# Patient Record
Sex: FEMALE | Race: BLACK OR AFRICAN AMERICAN | NOT HISPANIC OR LATINO | Employment: UNEMPLOYED | RURAL
[De-identification: names, ages, dates, MRNs, and addresses within clinical notes are randomized per-mention and may not be internally consistent; named-entity substitution may affect disease eponyms.]

---

## 2020-03-16 ENCOUNTER — HISTORICAL (OUTPATIENT)
Dept: ADMINISTRATIVE | Facility: HOSPITAL | Age: 50
End: 2020-03-16

## 2020-12-07 ENCOUNTER — HISTORICAL (OUTPATIENT)
Dept: ADMINISTRATIVE | Facility: HOSPITAL | Age: 50
End: 2020-12-07

## 2020-12-07 LAB
SARS-COV+SARS-COV-2 AG RESP QL IA.RAPID: NEGATIVE
SARS-COV+SARS-COV-2 AG RESP QL IA.RAPID: NEGATIVE

## 2021-04-14 DIAGNOSIS — Z12.31 ENCOUNTER FOR SCREENING MAMMOGRAM FOR MALIGNANT NEOPLASM OF BREAST: Primary | ICD-10-CM

## 2021-04-29 ENCOUNTER — HOSPITAL ENCOUNTER (OUTPATIENT)
Dept: RADIOLOGY | Facility: HOSPITAL | Age: 51
Discharge: HOME | End: 2021-04-29
Payer: MEDICAID

## 2021-04-29 VITALS — HEIGHT: 66 IN | BODY MASS INDEX: 32.95 KG/M2 | WEIGHT: 205 LBS

## 2021-04-29 DIAGNOSIS — Z12.31 ENCOUNTER FOR SCREENING MAMMOGRAM FOR MALIGNANT NEOPLASM OF BREAST: ICD-10-CM

## 2021-04-29 PROCEDURE — 77067 SCR MAMMO BI INCL CAD: CPT | Mod: TC

## 2021-08-09 ENCOUNTER — OFFICE VISIT (OUTPATIENT)
Dept: FAMILY MEDICINE | Facility: CLINIC | Age: 51
End: 2021-08-09
Payer: MEDICAID

## 2021-08-09 VITALS
HEIGHT: 66 IN | BODY MASS INDEX: 34.87 KG/M2 | SYSTOLIC BLOOD PRESSURE: 126 MMHG | DIASTOLIC BLOOD PRESSURE: 82 MMHG | OXYGEN SATURATION: 100 % | WEIGHT: 217 LBS | HEART RATE: 78 BPM | TEMPERATURE: 98 F | RESPIRATION RATE: 17 BRPM

## 2021-08-09 DIAGNOSIS — I10 BENIGN ESSENTIAL HYPERTENSION: Primary | ICD-10-CM

## 2021-08-09 DIAGNOSIS — T46.4X5A COUGH DUE TO ACE INHIBITOR: ICD-10-CM

## 2021-08-09 DIAGNOSIS — R05.8 COUGH DUE TO ACE INHIBITOR: ICD-10-CM

## 2021-08-09 LAB
ANION GAP SERPL CALCULATED.3IONS-SCNC: 8 MMOL/L (ref 7–16)
BUN SERPL-MCNC: 6 MG/DL (ref 7–18)
BUN/CREAT SERPL: 10 (ref 6–20)
CALCIUM SERPL-MCNC: 9.4 MG/DL (ref 8.5–10.1)
CHLORIDE SERPL-SCNC: 106 MMOL/L (ref 98–107)
CO2 SERPL-SCNC: 30 MMOL/L (ref 21–32)
CREAT SERPL-MCNC: 0.58 MG/DL (ref 0.55–1.02)
GLUCOSE SERPL-MCNC: 96 MG/DL (ref 74–106)
POTASSIUM SERPL-SCNC: 5 MMOL/L (ref 3.5–5.1)
SODIUM SERPL-SCNC: 139 MMOL/L (ref 136–145)

## 2021-08-09 PROCEDURE — 99214 PR OFFICE/OUTPT VISIT, EST, LEVL IV, 30-39 MIN: ICD-10-PCS | Mod: ,,, | Performed by: NURSE PRACTITIONER

## 2021-08-09 PROCEDURE — 99214 OFFICE O/P EST MOD 30 MIN: CPT | Mod: ,,, | Performed by: NURSE PRACTITIONER

## 2021-08-09 PROCEDURE — 80048 BASIC METABOLIC PNL TOTAL CA: CPT | Mod: ,,, | Performed by: CLINICAL MEDICAL LABORATORY

## 2021-08-09 PROCEDURE — 80048 BASIC METABOLIC PANEL: ICD-10-PCS | Mod: ,,, | Performed by: CLINICAL MEDICAL LABORATORY

## 2021-08-09 RX ORDER — AMLODIPINE BESYLATE 10 MG/1
10 TABLET ORAL DAILY
Qty: 30 TABLET | Refills: 11 | Status: SHIPPED | OUTPATIENT
Start: 2021-08-09 | End: 2023-02-09

## 2021-08-09 RX ORDER — LISINOPRIL 10 MG/1
10 TABLET ORAL DAILY
COMMUNITY
Start: 2021-06-29 | End: 2021-08-09

## 2023-02-09 ENCOUNTER — OFFICE VISIT (OUTPATIENT)
Dept: FAMILY MEDICINE | Facility: CLINIC | Age: 53
End: 2023-02-09
Payer: MEDICAID

## 2023-02-09 ENCOUNTER — APPOINTMENT (OUTPATIENT)
Dept: RADIOLOGY | Facility: CLINIC | Age: 53
End: 2023-02-09
Attending: NURSE PRACTITIONER
Payer: MEDICAID

## 2023-02-09 VITALS
HEART RATE: 76 BPM | TEMPERATURE: 98 F | RESPIRATION RATE: 20 BRPM | WEIGHT: 204 LBS | OXYGEN SATURATION: 99 % | DIASTOLIC BLOOD PRESSURE: 70 MMHG | HEIGHT: 66 IN | BODY MASS INDEX: 32.78 KG/M2 | SYSTOLIC BLOOD PRESSURE: 122 MMHG

## 2023-02-09 DIAGNOSIS — M25.512 ACUTE PAIN OF LEFT SHOULDER: ICD-10-CM

## 2023-02-09 DIAGNOSIS — M25.512 ACUTE PAIN OF LEFT SHOULDER: Primary | ICD-10-CM

## 2023-02-09 PROCEDURE — 3074F PR MOST RECENT SYSTOLIC BLOOD PRESSURE < 130 MM HG: ICD-10-PCS | Mod: CPTII,,, | Performed by: NURSE PRACTITIONER

## 2023-02-09 PROCEDURE — 1159F MED LIST DOCD IN RCRD: CPT | Mod: CPTII,,, | Performed by: NURSE PRACTITIONER

## 2023-02-09 PROCEDURE — 99213 PR OFFICE/OUTPT VISIT, EST, LEVL III, 20-29 MIN: ICD-10-PCS | Mod: 25,,, | Performed by: NURSE PRACTITIONER

## 2023-02-09 PROCEDURE — 3078F DIAST BP <80 MM HG: CPT | Mod: CPTII,,, | Performed by: NURSE PRACTITIONER

## 2023-02-09 PROCEDURE — 99213 OFFICE O/P EST LOW 20 MIN: CPT | Mod: 25,,, | Performed by: NURSE PRACTITIONER

## 2023-02-09 PROCEDURE — 3078F PR MOST RECENT DIASTOLIC BLOOD PRESSURE < 80 MM HG: ICD-10-PCS | Mod: CPTII,,, | Performed by: NURSE PRACTITIONER

## 2023-02-09 PROCEDURE — 73030 XR SHOULDER COMPLETE 2 OR MORE VIEWS LEFT: ICD-10-PCS | Mod: 26,LT,, | Performed by: RADIOLOGY

## 2023-02-09 PROCEDURE — 73030 X-RAY EXAM OF SHOULDER: CPT | Mod: TC,RHCUB,LT | Performed by: NURSE PRACTITIONER

## 2023-02-09 PROCEDURE — 3074F SYST BP LT 130 MM HG: CPT | Mod: CPTII,,, | Performed by: NURSE PRACTITIONER

## 2023-02-09 PROCEDURE — 73030 X-RAY EXAM OF SHOULDER: CPT | Mod: 26,LT,, | Performed by: RADIOLOGY

## 2023-02-09 PROCEDURE — 96372 THER/PROPH/DIAG INJ SC/IM: CPT | Mod: ,,, | Performed by: NURSE PRACTITIONER

## 2023-02-09 PROCEDURE — 1159F PR MEDICATION LIST DOCUMENTED IN MEDICAL RECORD: ICD-10-PCS | Mod: CPTII,,, | Performed by: NURSE PRACTITIONER

## 2023-02-09 PROCEDURE — 3008F BODY MASS INDEX DOCD: CPT | Mod: CPTII,,, | Performed by: NURSE PRACTITIONER

## 2023-02-09 PROCEDURE — 96372 PR INJECTION,THERAP/PROPH/DIAG2ST, IM OR SUBCUT: ICD-10-PCS | Mod: ,,, | Performed by: NURSE PRACTITIONER

## 2023-02-09 PROCEDURE — 3008F PR BODY MASS INDEX (BMI) DOCUMENTED: ICD-10-PCS | Mod: CPTII,,, | Performed by: NURSE PRACTITIONER

## 2023-02-09 RX ORDER — MELOXICAM 7.5 MG/1
7.5 TABLET ORAL DAILY
Qty: 30 TABLET | Refills: 0 | Status: SHIPPED | OUTPATIENT
Start: 2023-02-09 | End: 2023-03-11

## 2023-02-09 RX ORDER — DEXAMETHASONE SODIUM PHOSPHATE 4 MG/ML
8 INJECTION, SOLUTION INTRA-ARTICULAR; INTRALESIONAL; INTRAMUSCULAR; INTRAVENOUS; SOFT TISSUE ONCE
Status: COMPLETED | OUTPATIENT
Start: 2023-02-09 | End: 2023-02-09

## 2023-02-09 RX ORDER — KETOROLAC TROMETHAMINE 30 MG/ML
60 INJECTION, SOLUTION INTRAMUSCULAR; INTRAVENOUS
Status: COMPLETED | OUTPATIENT
Start: 2023-02-09 | End: 2023-02-09

## 2023-02-09 RX ADMIN — DEXAMETHASONE SODIUM PHOSPHATE 8 MG: 4 INJECTION, SOLUTION INTRA-ARTICULAR; INTRALESIONAL; INTRAMUSCULAR; INTRAVENOUS; SOFT TISSUE at 01:02

## 2023-02-09 RX ADMIN — KETOROLAC TROMETHAMINE 60 MG: 30 INJECTION, SOLUTION INTRAMUSCULAR; INTRAVENOUS at 01:02

## 2023-02-09 NOTE — PROGRESS NOTES
Subjective:       Patient ID: Jimena Mcmahon is a 52 y.o. female.    Chief Complaint: Shoulder Pain (Starts at the shoulder and radiates down; comes and go pain; worst at night )    Left anterior shoulder pain- hx of the same-pt states she was scheduled before Covid to have surgery on the left shoulder but it was postponed d/t Covid- no new injury  Review of Systems   Musculoskeletal:  Positive for arthralgias.   Neurological:  Negative for weakness and numbness.       Objective:      Physical Exam  Vitals and nursing note reviewed.   Constitutional:       General: She is not in acute distress.     Appearance: Normal appearance. She is normal weight. She is not ill-appearing, toxic-appearing or diaphoretic.   Cardiovascular:      Rate and Rhythm: Normal rate and regular rhythm.      Pulses: Normal pulses.      Heart sounds: Normal heart sounds.   Pulmonary:      Effort: Pulmonary effort is normal.      Breath sounds: Normal breath sounds. No wheezing or rhonchi.   Musculoskeletal:         General: Tenderness present. No swelling, deformity or signs of injury.      Left shoulder: Tenderness present. No swelling or effusion. Decreased range of motion. Normal strength. Normal pulse.        Arms:    Skin:     General: Skin is warm and dry.      Findings: No bruising, erythema, lesion or rash.   Neurological:      Mental Status: She is alert.          Assessment:       1. Acute pain of left shoulder        Plan:   Acute pain of left shoulder  -     X-Ray Shoulder 2 or More Views Left; Future; Expected date: 02/09/2023  -     ketorolac injection 60 mg  -     dexAMETHasone injection 8 mg  -     meloxicam (MOBIC) 7.5 MG tablet; Take 1 tablet (7.5 mg total) by mouth once daily.  Dispense: 30 tablet; Refill: 0         Risks, benefits, and side effects were discussed with the patient. All questions were answered to the fullest satisfaction of the patient, and pt verbalized understanding and agreement to treatment plan. Pt was  to call with any new or worsening symptoms, or present to the ER

## 2023-03-09 ENCOUNTER — OFFICE VISIT (OUTPATIENT)
Dept: ORTHOPEDICS | Facility: CLINIC | Age: 53
End: 2023-03-09
Payer: MEDICAID

## 2023-03-09 DIAGNOSIS — M25.512 ACUTE PAIN OF LEFT SHOULDER: Primary | ICD-10-CM

## 2023-03-09 PROCEDURE — 99204 OFFICE O/P NEW MOD 45 MIN: CPT | Mod: S$PBB,,, | Performed by: ORTHOPAEDIC SURGERY

## 2023-03-09 PROCEDURE — 99212 OFFICE O/P EST SF 10 MIN: CPT | Mod: PBBFAC | Performed by: ORTHOPAEDIC SURGERY

## 2023-03-09 PROCEDURE — 99204 PR OFFICE/OUTPT VISIT, NEW, LEVL IV, 45-59 MIN: ICD-10-PCS | Mod: S$PBB,,, | Performed by: ORTHOPAEDIC SURGERY

## 2023-03-09 NOTE — PROGRESS NOTES
CC:   Chief Complaint   Patient presents with    Left Shoulder - Pain        PREVIOUS INFO:        HISTORY:   3/9/2023    Jimena Mcmahon  is a 52 y.o. previous right rotator cuff repair by Dr. Orr here with left shoulder pain slowly been progressing for a couple of years she is been taking Mobic without a whole lot of relief she is been doing exercises she did on the other shoulder but is just gotten worse got have something done      PAST MEDICAL HISTORY: No past medical history on file.       PAST SURGICAL HISTORY:   Past Surgical History:   Procedure Laterality Date    TOTAL REDUCTION MAMMOPLASTY            ALLERGIES: Review of patient's allergies indicates:  No Known Allergies     MEDICATIONS :    Current Outpatient Medications:     amLODIPine (NORVASC) 10 MG tablet, Take 1 tablet (10 mg total) by mouth once daily., Disp: 30 tablet, Rfl: 11    meloxicam (MOBIC) 7.5 MG tablet, Take 1 tablet (7.5 mg total) by mouth once daily., Disp: 30 tablet, Rfl: 0     SOCIAL HISTORY:   Social History     Socioeconomic History    Marital status: Single   Tobacco Use    Smoking status: Never    Smokeless tobacco: Never   Substance and Sexual Activity    Alcohol use: Not Currently    Drug use: Never        ROS    FAMILY HISTORY: No family history on file.       PHYSICAL EXAM: There were no vitals filed for this visit.            There is no height or weight on file to calculate BMI.     In general, this is a well-developed, well-nourished female . The patient is alert, oriented and cooperative.      HEENT:  Normocephalic, atraumatic.  Extraocular movements are intact bilaterally.  The oropharynx is benign.       NECK:  Nontender with good range of motion.      PULMONARY: Respirations are even and non-labored.       CARDIOVASCULAR: Pulses regular by peripheral palpation.       ABDOMEN:  Soft, non-tender, non-distended.        EXTREMITIES:  Physical exam start with her neck she can move it fairly freely without  pain the sternoclavicular joints nontender the AC joint of the left is prominent with some tenderness the subacromial space is tender impingement testing causes pain abduction causes pain she is decreased internal rotation can not get to her belt loop    Ortho Exam      RADIOGRAPHIC FINDINGS:  X-rays left shoulder from February 9th of reviewed AP with internal external rotation and transscapular lateral there is early degenerative changes present in the AC joint no fracture dislocations appreciated      .      IMPRESSION:  Has been putting up with this for years she is been doing a home rehab program she is been on anti-inflammatories will work her left shoulder up    PLAN:  Left shoulder MRI  Patient requested start her pain medicine over Mobic I recommended against narcotics at this point we need to work her shoulder up if the current regimen is not working  There are no Patient Instructions on file for this visit.      No follow-ups on file.         Gregorio Miramontes III      (Subject to voice recognition error, transcription service not allowed)

## 2023-03-21 ENCOUNTER — HOSPITAL ENCOUNTER (OUTPATIENT)
Dept: RADIOLOGY | Facility: HOSPITAL | Age: 53
Discharge: HOME | End: 2023-03-21
Attending: ORTHOPAEDIC SURGERY
Payer: MEDICAID

## 2023-03-21 DIAGNOSIS — M25.512 ACUTE PAIN OF LEFT SHOULDER: ICD-10-CM

## 2023-03-21 PROCEDURE — 73221 MRI JOINT UPR EXTREM W/O DYE: CPT | Mod: TC,LT

## 2023-03-21 PROCEDURE — 73221 MRI JOINT UPR EXTREM W/O DYE: CPT | Mod: 26,LT,, | Performed by: RADIOLOGY

## 2023-03-21 PROCEDURE — 73221 MRI SHOULDER WITHOUT CONTRAST LEFT: ICD-10-PCS | Mod: 26,LT,, | Performed by: RADIOLOGY

## 2023-04-04 ENCOUNTER — OFFICE VISIT (OUTPATIENT)
Dept: ORTHOPEDICS | Facility: CLINIC | Age: 53
End: 2023-04-04
Payer: MEDICAID

## 2023-04-04 DIAGNOSIS — Z09 FOLLOW-UP EXAMINATION, FOLLOWING OTHER SURGERY: Primary | ICD-10-CM

## 2023-04-04 PROCEDURE — 99212 OFFICE O/P EST SF 10 MIN: CPT | Mod: PBBFAC | Performed by: ORTHOPAEDIC SURGERY

## 2023-04-04 PROCEDURE — 99213 PR OFFICE/OUTPT VISIT, EST, LEVL III, 20-29 MIN: ICD-10-PCS | Mod: S$PBB,,, | Performed by: ORTHOPAEDIC SURGERY

## 2023-04-04 PROCEDURE — 99213 OFFICE O/P EST LOW 20 MIN: CPT | Mod: S$PBB,,, | Performed by: ORTHOPAEDIC SURGERY

## 2023-04-04 RX ORDER — MELOXICAM 7.5 MG/1
7.5 TABLET ORAL DAILY
Qty: 30 TABLET | Refills: 1 | Status: SHIPPED | OUTPATIENT
Start: 2023-04-04

## 2023-04-04 NOTE — PROGRESS NOTES
CC:   Chief Complaint   Patient presents with    Follow-up     RECHECK LT SHOULDER AFTER MRI        PREVIOUS INFO:  HISTORY:   3/9/2023    Jimena Mcmahon  is a 52 y.o. previous right rotator cuff repair by Dr. Orr here with left shoulder pain slowly been progressing for a couple of years she is been taking Mobic without a whole lot of relief she is been doing exercises she did on the other shoulder but is just gotten worse got have something done            HISTORY:   4/4/2023    Jimena Jasso  is a 52 y.o. patient is on Mobic this is her left shoulder follow-up after the MRI fairly large partial tear says the Mobic as she is out of the really was not helping      PAST MEDICAL HISTORY: No past medical history on file.       PAST SURGICAL HISTORY:   Past Surgical History:   Procedure Laterality Date    TOTAL REDUCTION MAMMOPLASTY            ALLERGIES: Review of patient's allergies indicates:  No Known Allergies     MEDICATIONS :    Current Outpatient Medications:     amLODIPine (NORVASC) 10 MG tablet, Take 1 tablet (10 mg total) by mouth once daily., Disp: 30 tablet, Rfl: 11     SOCIAL HISTORY:   Social History     Socioeconomic History    Marital status: Single   Tobacco Use    Smoking status: Never    Smokeless tobacco: Never   Substance and Sexual Activity    Alcohol use: Not Currently    Drug use: Never        ROS    FAMILY HISTORY: No family history on file.       PHYSICAL EXAM: There were no vitals filed for this visit.            There is no height or weight on file to calculate BMI.     In general, this is a well-developed, well-nourished female . The patient is alert, oriented and cooperative.      HEENT:  Normocephalic, atraumatic.  Extraocular movements are intact bilaterally.  The oropharynx is benign.       NECK:  Nontender with good range of motion.      PULMONARY: Respirations are even and non-labored.       CARDIOVASCULAR: Pulses regular by peripheral palpation.       ABDOMEN:  Soft,  non-tender, non-distended.        EXTREMITIES:  Pinch min testing causes pain abduction causes pain she is lost significant motion    Ortho Exam      RADIOGRAPHIC FINDINGS:  MRI Shoulder Without Contrast Left: Result Notes     Gregorio Miramontes III, MD   3/23/2023  2:06 PM CDT       Fairly large partial rotator cuff tear of the supraspinatus tendon  Option of formal physical therapy versus surgical intervention she is been doing a home physical therapy program   Impression:     Mild-to-moderate degenerative change of the acromioclavicular joint.     Low to moderate grade partial thickness articular surface and interstitial tearing involving the supraspinatus tendon near the attachment. No convincing full-thickness tear.     Thickening and intermediate signal of the intra-articular portion of the long head of the biceps tendon suggestive of tendinopathy.     Red marrow reconversion suggested which can be seen with anemia or other hematopoietic abnormality.     Point of Service: [facility]        Electronically signed by: Alex Gage  Date:                                            03/21/2023    .      IMPRESSION:  Discussed with partial tears we will try physical therapy if unsuccessful options of surgery are present refill on Mobic  Follow-up about 6 weeks as her left shoulder  PLAN:   There are no Patient Instructions on file for this visit.      No follow-ups on file.         Gregorio Miramontes III      (Subject to voice recognition error, transcription service not allowed)

## 2024-03-20 ENCOUNTER — HOSPITAL ENCOUNTER (OUTPATIENT)
Dept: RADIOLOGY | Facility: HOSPITAL | Age: 54
Discharge: HOME | End: 2024-03-20
Payer: MEDICAID

## 2024-03-20 ENCOUNTER — OFFICE VISIT (OUTPATIENT)
Dept: ORTHOPEDICS | Facility: CLINIC | Age: 54
End: 2024-03-20
Payer: MEDICAID

## 2024-03-20 VITALS — BODY MASS INDEX: 32.78 KG/M2 | HEIGHT: 66 IN | WEIGHT: 204 LBS

## 2024-03-20 DIAGNOSIS — M25.561 ACUTE PAIN OF RIGHT KNEE: Primary | ICD-10-CM

## 2024-03-20 DIAGNOSIS — G89.29 CHRONIC PAIN OF LEFT KNEE: Primary | ICD-10-CM

## 2024-03-20 DIAGNOSIS — M25.562 CHRONIC PAIN OF LEFT KNEE: Primary | ICD-10-CM

## 2024-03-20 DIAGNOSIS — M25.561 ACUTE PAIN OF RIGHT KNEE: ICD-10-CM

## 2024-03-20 PROCEDURE — 99213 OFFICE O/P EST LOW 20 MIN: CPT | Mod: PBBFAC

## 2024-03-20 PROCEDURE — 73562 X-RAY EXAM OF KNEE 3: CPT | Mod: 26,RT,, | Performed by: RADIOLOGY

## 2024-03-20 PROCEDURE — 73562 X-RAY EXAM OF KNEE 3: CPT | Mod: TC,RT

## 2024-03-20 PROCEDURE — 99213 OFFICE O/P EST LOW 20 MIN: CPT | Mod: S$PBB,,,

## 2024-03-20 PROCEDURE — 3008F BODY MASS INDEX DOCD: CPT | Mod: CPTII,,,

## 2024-03-20 PROCEDURE — 1159F MED LIST DOCD IN RCRD: CPT | Mod: CPTII,,,

## 2024-03-20 NOTE — PROGRESS NOTES
CC:   Chief Complaint   Patient presents with    Right Lower Leg - Pain          Jimena Jasso is a 53 y.o. female seen today for follow up of Pain of the Right Lower Leg  Patient is known to orthopedic clinic for previous right shoulder arthroscopy.  Now presents today with right knee pain.  Patient states she was originally evaluated for knee pain in July of 2020 by Francisco J Townsend, nurse practitioner, who sent her to physical therapy at that time.  Patient states she has continued pain after physical therapy and was going to follow up for MRI however appointments fell through.  Patient presents today complaining of worsening right.  She reports buckling and instability of the knee.  She reports occasional swelling of the knee as well.  Denies any new fall or injury.  No other complaints today.      PAST MEDICAL HISTORY: History reviewed. No pertinent past medical history.     PAST SURGICAL HISTORY:   Past Surgical History:   Procedure Laterality Date    TOTAL REDUCTION MAMMOPLASTY          ALLERGIES: Review of patient's allergies indicates:  No Known Allergies     MEDICATIONS :    Current Outpatient Medications:     amLODIPine (NORVASC) 10 MG tablet, Take 1 tablet (10 mg total) by mouth once daily., Disp: 30 tablet, Rfl: 11    meloxicam (MOBIC) 7.5 MG tablet, Take 1 tablet (7.5 mg total) by mouth once daily., Disp: 30 tablet, Rfl: 1     SOCIAL HISTORY:   Social History     Socioeconomic History    Marital status: Single   Tobacco Use    Smoking status: Never    Smokeless tobacco: Never   Substance and Sexual Activity    Alcohol use: Not Currently    Drug use: Never        FAMILY HISTORY: History reviewed. No pertinent family history.       PHYSICAL EXAM:      There were no vitals filed for this visit.  Body mass index is 32.93 kg/m².    GENERAL: Well-developed, well-nourished female . The patient is alert, oriented and cooperative.    EXTREMITIES: Right knee with skin c/d/I, tenderness to  palpation along inferior pole of patella, range of motion of knee from approximately 0-120 degrees but not without pain, knee is stable to varus and valgus stress testing, positive Kwabena's, neurovascularly intact      RADIOGRAPHIC FINDINGS:   X-Ray Knee AP LAT with Sunrise Right    Result Date: 3/20/2024  EXAMINATION: XR KNEE AP LAT WITH SUNRISE RIGHT CLINICAL HISTORY: Pain in right knee COMPARISON: None TECHNIQUE: Frontal, lateral, and sunrise views of the right knee. FINDINGS: Mild tricompartmental degenerative change.  No convincing acute fracture or dislocation demonstrated. No concerning radiopaque foreign body visualized.     As above. Point of Service: [facility] Electronically signed by: Alex Gage Date:    03/20/2024 Time:    10:19       Patient Active Problem List    Diagnosis Date Noted    Acute pain of left shoulder 02/09/2023     IMPRESSION AND PLAN:  Chronic right knee pain.  With previous workup including physical therapy.  Discussed patient with Dr. Finnegan who recommended further evaluation with an MRI.  Continue conservative treatment including NSAIDs, ice and elevation therapy in the meantime.  We will call with MRI results and schedule appointment with Dr. Finnegan if needed.        No follow-ups on file.       Tia Sierra PA-C      (Subject to voice recognition error, transcription service not allowed)

## 2024-03-20 NOTE — PATIENT INSTRUCTIONS
Chronic right knee pain.  With previous workup including physical therapy.  Discussed patient with Dr. Finnegan who recommended further evaluation with an MRI.  Continue conservative treatment including NSAIDs, ice and elevation therapy in the meantime.  We will call with MRI results and schedule appointment with Dr. Finnegan if needed.

## 2024-03-28 ENCOUNTER — TELEPHONE (OUTPATIENT)
Dept: ORTHOPEDICS | Facility: CLINIC | Age: 54
End: 2024-03-28
Payer: MEDICAID

## 2024-03-28 NOTE — TELEPHONE ENCOUNTER
----- Message from Yanely Barillas sent at 3/28/2024 11:22 AM CDT -----  Calling to reschedule mri that she missed today - Pt call back # [phone]

## 2024-05-01 ENCOUNTER — TELEPHONE (OUTPATIENT)
Dept: ORTHOPEDICS | Facility: CLINIC | Age: 54
End: 2024-05-01
Payer: MEDICAID

## 2024-05-08 DIAGNOSIS — M25.562 CHRONIC PAIN OF LEFT KNEE: Primary | ICD-10-CM

## 2024-05-08 DIAGNOSIS — G89.29 CHRONIC PAIN OF LEFT KNEE: Primary | ICD-10-CM

## 2024-11-19 ENCOUNTER — OFFICE VISIT (OUTPATIENT)
Dept: OBSTETRICS AND GYNECOLOGY | Facility: CLINIC | Age: 54
End: 2024-11-19
Payer: MEDICAID

## 2024-11-19 VITALS
DIASTOLIC BLOOD PRESSURE: 81 MMHG | WEIGHT: 208 LBS | RESPIRATION RATE: 17 BRPM | HEIGHT: 66 IN | HEART RATE: 68 BPM | SYSTOLIC BLOOD PRESSURE: 126 MMHG | BODY MASS INDEX: 33.43 KG/M2 | OXYGEN SATURATION: 99 %

## 2024-11-19 DIAGNOSIS — Z86.2 HISTORY OF ANEMIA: ICD-10-CM

## 2024-11-19 DIAGNOSIS — R53.83 FATIGUE, UNSPECIFIED TYPE: ICD-10-CM

## 2024-11-19 DIAGNOSIS — Z72.51 HIGH RISK HETEROSEXUAL BEHAVIOR: ICD-10-CM

## 2024-11-19 DIAGNOSIS — R23.2 HOT FLASHES: ICD-10-CM

## 2024-11-19 DIAGNOSIS — Z12.4 SCREENING FOR CERVICAL CANCER: ICD-10-CM

## 2024-11-19 DIAGNOSIS — Z01.411 ENCOUNTER FOR GYNECOLOGICAL EXAMINATION (GENERAL) (ROUTINE) WITH ABNORMAL FINDINGS: Primary | ICD-10-CM

## 2024-11-19 DIAGNOSIS — R30.0 DYSURIA: ICD-10-CM

## 2024-11-19 DIAGNOSIS — Z12.39 ENCOUNTER FOR SCREENING FOR MALIGNANT NEOPLASM OF BREAST, UNSPECIFIED SCREENING MODALITY: ICD-10-CM

## 2024-11-19 DIAGNOSIS — Z11.3 SCREENING EXAMINATION FOR SEXUALLY TRANSMITTED DISEASE: ICD-10-CM

## 2024-11-19 LAB
BILIRUB SERPL-MCNC: NORMAL MG/DL
BLOOD URINE, POC: NORMAL
CANDIDA SPECIES: NEGATIVE
CLARITY, UA: CLEAR
COLOR, UA: YELLOW
GARDNERELLA: POSITIVE
GLUCOSE UR QL STRIP: NORMAL
KETONES UR QL STRIP: NORMAL
LEUKOCYTE ESTERASE URINE, POC: NORMAL
NITRITE, POC UA: NORMAL
PH, POC UA: 6
PROTEIN, POC: NORMAL
SPECIFIC GRAVITY, POC UA: 1.02
TRICHOMONAS: NEGATIVE
UROBILINOGEN, POC UA: 0.2

## 2024-11-19 PROCEDURE — 88142 CYTOPATH C/V THIN LAYER: CPT | Mod: TC,GCY | Performed by: ADVANCED PRACTICE MIDWIFE

## 2024-11-19 PROCEDURE — 87624 HPV HI-RISK TYP POOLED RSLT: CPT | Mod: ,,, | Performed by: CLINICAL MEDICAL LABORATORY

## 2024-11-19 PROCEDURE — 3008F BODY MASS INDEX DOCD: CPT | Mod: CPTII,,, | Performed by: ADVANCED PRACTICE MIDWIFE

## 2024-11-19 PROCEDURE — 1159F MED LIST DOCD IN RCRD: CPT | Mod: CPTII,,, | Performed by: ADVANCED PRACTICE MIDWIFE

## 2024-11-19 PROCEDURE — 99396 PREV VISIT EST AGE 40-64: CPT | Mod: ,,, | Performed by: ADVANCED PRACTICE MIDWIFE

## 2024-11-19 PROCEDURE — 87491 CHLMYD TRACH DNA AMP PROBE: CPT | Mod: ,,, | Performed by: CLINICAL MEDICAL LABORATORY

## 2024-11-19 PROCEDURE — 87480 CANDIDA DNA DIR PROBE: CPT | Mod: ,,, | Performed by: CLINICAL MEDICAL LABORATORY

## 2024-11-19 PROCEDURE — 88141 CYTOPATH C/V INTERPRET: CPT | Mod: ,,, | Performed by: PATHOLOGY

## 2024-11-19 PROCEDURE — 87510 GARDNER VAG DNA DIR PROBE: CPT | Mod: ,,, | Performed by: CLINICAL MEDICAL LABORATORY

## 2024-11-19 PROCEDURE — 87591 N.GONORRHOEAE DNA AMP PROB: CPT | Mod: ,,, | Performed by: CLINICAL MEDICAL LABORATORY

## 2024-11-19 PROCEDURE — 87660 TRICHOMONAS VAGIN DIR PROBE: CPT | Mod: ,,, | Performed by: CLINICAL MEDICAL LABORATORY

## 2024-11-19 PROCEDURE — 3079F DIAST BP 80-89 MM HG: CPT | Mod: CPTII,,, | Performed by: ADVANCED PRACTICE MIDWIFE

## 2024-11-19 PROCEDURE — 3074F SYST BP LT 130 MM HG: CPT | Mod: CPTII,,, | Performed by: ADVANCED PRACTICE MIDWIFE

## 2024-11-19 RX ORDER — AMLODIPINE BESYLATE 5 MG/1
5 TABLET ORAL NIGHTLY
COMMUNITY
Start: 2024-10-31

## 2024-11-19 NOTE — PROGRESS NOTES
"Patient ID:  Jimena Jasso is a 54 y.o. female      Chief Complaint:   Chief Complaint   Patient presents with    Annual Exam     Last pap: With Dr. Basilio    STD CHECK        HPI:  Ms Jasso is her for a Gyn exam and testing for infections.  She has monthly menstrual period that last 3-7 days.  She sometimes skips a period.  She denies hot flashes. She has not a had  recent pap smear.  LMP: Patient's last menstrual period was 2024.   Sexually active:  yes  Contraceptive: BTL      Past Medical History:   Diagnosis Date    Essential (primary) hypertension     Unspecified osteoarthritis, unspecified site      Past Surgical History:   Procedure Laterality Date    BILATERAL TUBAL LIGATION Bilateral     GANGLION CYST EXCISION Left     TOTAL REDUCTION MAMMOPLASTY         OB History          3    Para   3    Term   3            AB        Living   3         SAB        IAB        Ectopic        Multiple        Live Births                     /81 (BP Location: Right arm, Patient Position: Sitting)   Pulse 68   Resp 17   Ht 5' 6" (1.676 m)   Wt 94.3 kg (208 lb)   LMP 2024   SpO2 99%   BMI 33.57 kg/m²   Wt Readings from Last 3 Encounters:   24 94.3 kg (208 lb)   24 92.5 kg (204 lb)   23 92.5 kg (204 lb)          ROS:  Review of Systems   Constitutional:  Positive for fatigue.   HENT: Negative.     Respiratory: Negative.     Cardiovascular: Negative.    Gastrointestinal: Negative.    Endocrine: Negative.    Genitourinary:  Positive for vaginal discharge. Negative for frequency, menstrual problem, urinary incontinence and vaginal dryness.   Musculoskeletal:  Positive for arthralgias.   Psychiatric/Behavioral: Negative.     Breast: negative.           PHYSICAL EXAM:  Physical Exam  Vitals and nursing note reviewed.   Constitutional:       General: She is not in acute distress.     Appearance: Normal appearance. She is obese. She is not ill-appearing.   HENT:      Head: " Normocephalic.      Nose: Nose normal.   Eyes:      Extraocular Movements: Extraocular movements intact.   Cardiovascular:      Rate and Rhythm: Normal rate and regular rhythm.      Pulses: Normal pulses.      Heart sounds: Normal heart sounds.   Pulmonary:      Effort: Pulmonary effort is normal.      Breath sounds: Normal breath sounds.   Chest:      Chest wall: No mass or tenderness.   Breasts:     Right: Normal. No inverted nipple, mass, nipple discharge, skin change or tenderness.      Left: No inverted nipple, mass, nipple discharge, skin change or tenderness.      Comments: Scaring from breast reduction.  Genitourinary:     General: Normal vulva.      Exam position: Lithotomy position.      Pubic Area: No rash.       Labia:         Right: No tenderness or lesion.         Left: No tenderness or lesion.       Vagina: Vaginal discharge present. No bleeding.      Cervix: Normal. No cervical motion tenderness or friability.      Uterus: Normal. Not tender.       Adnexa: Right adnexa normal.   Musculoskeletal:         General: Normal range of motion.      Cervical back: Normal range of motion and neck supple. No tenderness.   Lymphadenopathy:      Lower Body: No right inguinal adenopathy. No left inguinal adenopathy.   Skin:     General: Skin is warm and dry.   Neurological:      Mental Status: She is alert and oriented to person, place, and time.   Psychiatric:         Mood and Affect: Mood normal.         Behavior: Behavior normal.         Thought Content: Thought content normal.         Judgment: Judgment normal.          Assessment:  Jimena was seen today for annual exam and std check.    Diagnoses and all orders for this visit:    Encounter for gynecological examination (general) (routine) with abnormal findings    Hot flashes  -     Follicle Stimulating Hormone; Future  -     Cancel: Follicle Stimulating Hormone    Encounter for screening for malignant neoplasm of breast, unspecified screening modality  -      Mammo Digital Screening Bilat w/ Dusty; Future    Dysuria  -     POCT URINALYSIS W/O SCOPE    Screening for cervical cancer  -     ThinPrep Pap Test; Future  -     ThinPrep Pap Test  -     HPV DNA probe, amplified    Screening examination for sexually transmitted disease  -     Bacterial Vaginosis  -     Chlamydia/GC, PCR; Future  -     Chlamydia/GC, PCR    High risk heterosexual behavior  -     Chlamydia/GC, PCR; Future  -     Chlamydia/GC, PCR    Fatigue, unspecified type  -     Hemoglobin and Hematocrit; Future  -     Cancel: Hemoglobin and Hematocrit    History of anemia  -     Hemoglobin and Hematocrit          ICD-10-CM ICD-9-CM    1. Encounter for gynecological examination (general) (routine) with abnormal findings  Z01.411 V72.31       2. Hot flashes  R23.2 782.62 Follicle Stimulating Hormone      CANCELED: Follicle Stimulating Hormone      3. Encounter for screening for malignant neoplasm of breast, unspecified screening modality  Z12.39 V76.10 Mammo Digital Screening Bilat w/ Dusty      4. Dysuria  R30.0 788.1 POCT URINALYSIS W/O SCOPE      5. Screening for cervical cancer  Z12.4 V76.2 ThinPrep Pap Test      ThinPrep Pap Test      HPV DNA probe, amplified      6. Screening examination for sexually transmitted disease  Z11.3 V74.5 Bacterial Vaginosis      Chlamydia/GC, PCR      Chlamydia/GC, PCR      7. High risk heterosexual behavior  Z72.51 V69.2 Chlamydia/GC, PCR      Chlamydia/GC, PCR      8. Fatigue, unspecified type  R53.83 780.79 Hemoglobin and Hematocrit      CANCELED: Hemoglobin and Hematocrit      9. History of anemia  Z86.2 V12.3 Hemoglobin and Hematocrit          Plan: Call result of testing  Mammogram scheduled        Follow up in about 1 year (around 11/19/2025).

## 2024-11-20 DIAGNOSIS — B96.89 BACTERIAL VAGINOSIS: Primary | ICD-10-CM

## 2024-11-20 DIAGNOSIS — N76.0 BACTERIAL VAGINOSIS: Primary | ICD-10-CM

## 2024-11-20 RX ORDER — METRONIDAZOLE 500 MG/1
500 TABLET ORAL 2 TIMES DAILY
Qty: 14 TABLET | Refills: 0 | Status: SHIPPED | OUTPATIENT
Start: 2024-11-20 | End: 2024-11-27

## 2024-11-21 ENCOUNTER — TELEPHONE (OUTPATIENT)
Dept: OBSTETRICS AND GYNECOLOGY | Facility: CLINIC | Age: 54
End: 2024-11-21
Payer: MEDICAID

## 2024-11-21 DIAGNOSIS — R23.2 HOT FLASHES: Primary | ICD-10-CM

## 2024-11-21 DIAGNOSIS — Z86.2 HISTORY OF ANEMIA: ICD-10-CM

## 2024-11-21 LAB
CHLAMYDIA BY PCR: NEGATIVE
HCT VFR BLD AUTO: 35.9 % (ref 38–47)
HGB BLD-MCNC: 11 G/DL (ref 12–16)
N. GONORRHOEAE (GC) BY PCR: NEGATIVE

## 2024-11-21 PROCEDURE — 85018 HEMOGLOBIN: CPT | Mod: ,,, | Performed by: CLINICAL MEDICAL LABORATORY

## 2024-11-21 PROCEDURE — 85014 HEMATOCRIT: CPT | Mod: ,,, | Performed by: CLINICAL MEDICAL LABORATORY

## 2024-11-21 NOTE — TELEPHONE ENCOUNTER
----- Message from Anel De La O sent at 11/21/2024  9:24 AM CST -----  Review with pt.as FSH and H & H have to be redrawn.  Lab error.

## 2024-11-22 LAB
GH SERPL-MCNC: ABNORMAL NG/ML
INSULIN SERPL-ACNC: ABNORMAL U[IU]/ML
LAB AP CLINICAL INFORMATION: ABNORMAL
LAB AP GYN INTERPRETATION: ABNORMAL
LAB AP PAP DISCLAIMER COMMENTS: ABNORMAL
RENIN PLAS-CCNC: ABNORMAL NG/ML/H

## 2024-11-26 DIAGNOSIS — D64.9 MILD ANEMIA: Primary | ICD-10-CM

## 2024-11-26 LAB
HPV 16: NEGATIVE
HPV 18: NEGATIVE
HPV OTHER: NEGATIVE

## 2024-11-26 RX ORDER — FERROUS SULFATE 325(65) MG
325 TABLET, DELAYED RELEASE (ENTERIC COATED) ORAL DAILY
Qty: 60 TABLET | Refills: 0 | Status: SHIPPED | OUTPATIENT
Start: 2024-11-26

## 2024-11-27 ENCOUNTER — TELEPHONE (OUTPATIENT)
Dept: OBSTETRICS AND GYNECOLOGY | Facility: CLINIC | Age: 54
End: 2024-11-27
Payer: MEDICAID

## 2024-11-27 ENCOUNTER — HOSPITAL ENCOUNTER (OUTPATIENT)
Dept: RADIOLOGY | Facility: HOSPITAL | Age: 54
Discharge: HOME | End: 2024-11-27
Attending: ADVANCED PRACTICE MIDWIFE
Payer: MEDICAID

## 2024-11-27 DIAGNOSIS — Z12.39 ENCOUNTER FOR SCREENING FOR MALIGNANT NEOPLASM OF BREAST, UNSPECIFIED SCREENING MODALITY: ICD-10-CM

## 2024-11-27 PROCEDURE — 77063 BREAST TOMOSYNTHESIS BI: CPT | Mod: 26,,, | Performed by: RADIOLOGY

## 2024-11-27 PROCEDURE — 77067 SCR MAMMO BI INCL CAD: CPT | Mod: 26,,, | Performed by: RADIOLOGY

## 2024-11-27 PROCEDURE — 77067 SCR MAMMO BI INCL CAD: CPT | Mod: TC

## 2024-11-27 NOTE — TELEPHONE ENCOUNTER
----- Message from Anel De La O sent at 11/26/2024  8:45 AM CST -----  Review with pt.as ASCUS pap and neg HPV.  Repeat pap in one year.  I sent iron to take 2 months for mild anemia